# Patient Record
Sex: FEMALE | Race: WHITE
[De-identification: names, ages, dates, MRNs, and addresses within clinical notes are randomized per-mention and may not be internally consistent; named-entity substitution may affect disease eponyms.]

---

## 2019-07-11 ENCOUNTER — HOSPITAL ENCOUNTER (EMERGENCY)
Dept: HOSPITAL 25 - ED | Age: 44
Discharge: HOME | End: 2019-07-11
Payer: COMMERCIAL

## 2019-07-11 VITALS — DIASTOLIC BLOOD PRESSURE: 97 MMHG | SYSTOLIC BLOOD PRESSURE: 119 MMHG

## 2019-07-11 DIAGNOSIS — J03.90: Primary | ICD-10-CM

## 2019-07-11 DIAGNOSIS — Z91.040: ICD-10-CM

## 2019-07-11 LAB
ALBUMIN SERPL BCG-MCNC: 4.2 G/DL (ref 3.2–5.2)
ALBUMIN/GLOB SERPL: 1.5 {RATIO} (ref 1–3)
ALP SERPL-CCNC: 86 U/L (ref 34–104)
ALT SERPL W P-5'-P-CCNC: 9 U/L (ref 7–52)
ANION GAP SERPL CALC-SCNC: 7 MMOL/L (ref 2–11)
AST SERPL-CCNC: 10 U/L (ref 13–39)
BASOPHILS # BLD AUTO: 0.1 10^3/UL (ref 0–0.2)
BUN SERPL-MCNC: 8 MG/DL (ref 6–24)
BUN/CREAT SERPL: 9.4 (ref 8–20)
CALCIUM SERPL-MCNC: 9.3 MG/DL (ref 8.6–10.3)
CHLORIDE SERPL-SCNC: 104 MMOL/L (ref 101–111)
EOSINOPHIL # BLD AUTO: 0 10^3/UL (ref 0–0.6)
GLOBULIN SER CALC-MCNC: 2.8 G/DL (ref 2–4)
GLUCOSE SERPL-MCNC: 143 MG/DL (ref 70–100)
HCO3 SERPL-SCNC: 25 MMOL/L (ref 22–32)
HCT VFR BLD AUTO: 35 % (ref 35–47)
HGB BLD-MCNC: 11.7 G/DL (ref 12–16)
LYMPHOCYTES # BLD AUTO: 1.2 10^3/UL (ref 1–4.8)
MCH RBC QN AUTO: 27 PG (ref 27–31)
MCHC RBC AUTO-ENTMCNC: 33 G/DL (ref 31–36)
MCV RBC AUTO: 81 FL (ref 80–97)
MONOCYTES # BLD AUTO: 0.6 10^3/UL (ref 0–0.8)
NEUTROPHILS # BLD AUTO: 8.8 10^3/UL (ref 1.5–7.7)
NRBC # BLD AUTO: 0 10^3/UL
NRBC BLD QL AUTO: 0
PLATELET # BLD AUTO: 211 10^3/UL (ref 150–450)
POTASSIUM SERPL-SCNC: 3.8 MMOL/L (ref 3.5–5)
PROT SERPL-MCNC: 7 G/DL (ref 6.4–8.9)
RBC # BLD AUTO: 4.4 10^6 /UL (ref 3.7–4.87)
SODIUM SERPL-SCNC: 136 MMOL/L (ref 135–145)
WBC # BLD AUTO: 10.6 10^3/UL (ref 3.5–10.8)

## 2019-07-11 PROCEDURE — 86308 HETEROPHILE ANTIBODY SCREEN: CPT

## 2019-07-11 PROCEDURE — 80053 COMPREHEN METABOLIC PANEL: CPT

## 2019-07-11 PROCEDURE — 85025 COMPLETE CBC W/AUTO DIFF WBC: CPT

## 2019-07-11 PROCEDURE — 96374 THER/PROPH/DIAG INJ IV PUSH: CPT

## 2019-07-11 PROCEDURE — 36415 COLL VENOUS BLD VENIPUNCTURE: CPT

## 2019-07-11 PROCEDURE — 99282 EMERGENCY DEPT VISIT SF MDM: CPT

## 2019-07-11 PROCEDURE — 96361 HYDRATE IV INFUSION ADD-ON: CPT

## 2019-07-11 PROCEDURE — 87651 STREP A DNA AMP PROBE: CPT

## 2019-07-11 PROCEDURE — 96375 TX/PRO/DX INJ NEW DRUG ADDON: CPT

## 2019-07-11 NOTE — ED
Throat Pain/Nasal Congestion





- HPI Summary


HPI Summary: 


Patient is a 43-year-old female who presents emergency department for sore 

throat and fever 2 days.  Patient notes nausea and ear patient pain. Pt. 

denies past medical history.  Patient notes throat pain today is too great to 

speak.  Patient states she is able to tolerate some liquids but it is very 

painful.  Patient denies sick contacts.  Symptoms are mild to moderate in 

severity.  No current modifying factors.








- History of Current Complaint


Chief Complaint: EDThroatPain


Time Seen by Provider: 07/11/19 09:38


Hx Obtained From: Patient





- Allergies/Home Medications


Allergies/Adverse Reactions: 


 Allergies











Allergy/AdvReac Type Severity Reaction Status Date / Time


 


latex Allergy  Airway Verified 07/11/19 09:52





   Obstruction  














PMH/Surg Hx/FS Hx/Imm Hx


Previously Healthy: Yes


Infectious Disease History: No


Infectious Disease History: 


   Denies: Traveled Outside the [place] in Last 30 Days





- Social History


Alcohol Use: None


Substance Use Type: Reports: None


Smoking Status (MU): Never Smoked Tobacco





Review of Systems


Positive: Fever, Chills


Eyes: Negative


Positive: Sore Throat, Ear Ache


Cardiovascular: Negative


Respiratory: Negative


Negative: Shortness Of Breath, Cough


Positive: Nausea.  Negative: Abdominal Pain, Vomiting, Diarrhea


Genitourinary: Negative


Skin: Negative


Negative: Rash


Neurological: Negative


All Other Systems Reviewed And Are Negative: Yes





Physical Exam


Triage Information Reviewed: Yes


Vital Signs On Initial Exam: 


 Initial Vitals











Temp Pulse Resp BP Pulse Ox


 


 101.8 F   91   18   159/96   97 


 


 07/11/19 09:29  07/11/19 09:29  07/11/19 09:29  07/11/19 09:29  07/11/19 09:29











Vital Signs Reviewed: Yes


Appearance: Positive: Well-Appearing - Pt. sitting up in bed in NAD.  

present.


Skin: Positive: Warm, Dry


Head/Face: Positive: Normal Head/Face Inspection


Eyes: Positive: Normal, EOMI, YOJANA


ENT: Positive: TMs normal, Other - Oral pharynx with marked edema, erythema or 

excudates bilaterally. Uvula is midline without displacement. No trismus. Pt. 

is whispering so cannot access for muffled voice. No pooling of secretions.


Neck: Positive: Enlarged Nodes @ - bilateral cervical chains


Respiratory/Lung Sounds: Positive: Clear to Auscultation, Breath Sounds Present


Cardiovascular: Positive: Normal, RRR


Musculoskeletal: Positive: Normal, Strength/ROM Intact


Neurological: Positive: Normal, CN Intact II-III


Psychiatric: Positive: Affect/Mood Appropriate





Diagnostics





- Vital Signs


 Vital Signs











  Temp Pulse Resp BP Pulse Ox


 


 07/11/19 09:29  101.8 F  91  18  159/96  97














- Laboratory


Result Diagrams: 


 07/11/19 10:12





 07/11/19 10:12


Lab Statement: Any lab studies that have been ordered have been reviewed, and 

results considered in the medical decision making process.





EENT Course/Dx





- Course


Course Of Treatment: Patient presenting with bilateral tonsillar edema with 

exudate.  She does 101.8F.  Vital signs are otherwise normal.  Patient has no 

trismus or evidence of tonsillar abscess on exam.  Patient given IV fluids, 

Decadron drawn and Toradol.  Basic labs, strep and mono screen obtained.  Labs 

are unremarkable including negative strep and mono.  On reexamination patient 

states she is feeling much better and is tolerating by mouth fluids without 

difficulty.  We'll treat for suspected bacterial pharyngitis with amoxicillin.  

Follow-up with the [facility] clinic for recheck in 2-3 days.  To 

continue ibuprofen for pain and swelling as directed.  To return to the ER 

symptoms change or worsen.  Patient understands and agrees with plan.





- Differential Diagnoses


Differential Diagnoses: Periodontic Abscess, Pharyngitis, Tonsilitis





- Diagnoses


Provider Diagnoses: 


 Acute infective tonsillitis








Discharge





- Sign-Out/Discharge


Documenting (check all that apply): Patient Departure


Patient Received Moderate/Deep Sedation with Procedure: No





- Discharge Plan


Condition: Improved


Disposition: HOME


Prescriptions: 


Amoxicillin [Amoxicillin 250 MG/5 ML] 500 mg PO BID #200 susp.recon


Magic Mouth Was-RAI/MAAL/LIDO* 5 ml SWISH SPIT QID #100 ml


Patient Education Materials:  Tonsillitis (ED)


Referrals: 


[facility] [Outside] - 2 Days


Additional Instructions: 


Schedule a follow up appointment with the [facility]


Take medication as directed


Motrin for pain and fever as directed


Increase fluids


Return to ER if symptoms change or worsen





- Billing Disposition and Condition


Condition: IMPROVED


Disposition: Home